# Patient Record
Sex: FEMALE | Race: BLACK OR AFRICAN AMERICAN | NOT HISPANIC OR LATINO
[De-identification: names, ages, dates, MRNs, and addresses within clinical notes are randomized per-mention and may not be internally consistent; named-entity substitution may affect disease eponyms.]

---

## 2018-05-22 ENCOUNTER — RESULT REVIEW (OUTPATIENT)
Age: 23
End: 2018-05-22

## 2019-10-25 ENCOUNTER — TRANSCRIPTION ENCOUNTER (OUTPATIENT)
Age: 24
End: 2019-10-25

## 2020-01-05 ENCOUNTER — TRANSCRIPTION ENCOUNTER (OUTPATIENT)
Age: 25
End: 2020-01-05

## 2020-04-11 ENCOUNTER — TRANSCRIPTION ENCOUNTER (OUTPATIENT)
Age: 25
End: 2020-04-11

## 2022-03-23 ENCOUNTER — TRANSCRIPTION ENCOUNTER (OUTPATIENT)
Age: 27
End: 2022-03-23

## 2022-09-27 ENCOUNTER — NON-APPOINTMENT (OUTPATIENT)
Age: 27
End: 2022-09-27

## 2022-09-28 ENCOUNTER — APPOINTMENT (OUTPATIENT)
Dept: SURGERY | Facility: CLINIC | Age: 27
End: 2022-09-28
Payer: COMMERCIAL

## 2022-09-28 VITALS
SYSTOLIC BLOOD PRESSURE: 133 MMHG | DIASTOLIC BLOOD PRESSURE: 93 MMHG | WEIGHT: 230 LBS | TEMPERATURE: 98.4 F | HEIGHT: 65 IN | BODY MASS INDEX: 38.32 KG/M2 | HEART RATE: 89 BPM | OXYGEN SATURATION: 98 %

## 2022-09-28 PROBLEM — Z00.00 ENCOUNTER FOR PREVENTIVE HEALTH EXAMINATION: Status: ACTIVE | Noted: 2022-09-28

## 2022-09-28 PROCEDURE — 46083 INC THROMBOSED HROID XTRNL: CPT

## 2022-09-28 PROCEDURE — 99203 OFFICE O/P NEW LOW 30 MIN: CPT | Mod: 1L

## 2023-02-15 ENCOUNTER — NON-APPOINTMENT (OUTPATIENT)
Age: 28
End: 2023-02-15

## 2023-02-16 ENCOUNTER — NON-APPOINTMENT (OUTPATIENT)
Age: 28
End: 2023-02-16

## 2023-03-06 ENCOUNTER — APPOINTMENT (OUTPATIENT)
Dept: CARDIOLOGY | Facility: CLINIC | Age: 28
End: 2023-03-06

## 2023-03-09 ENCOUNTER — NON-APPOINTMENT (OUTPATIENT)
Age: 28
End: 2023-03-09

## 2023-04-29 ENCOUNTER — NON-APPOINTMENT (OUTPATIENT)
Age: 28
End: 2023-04-29

## 2023-06-29 ENCOUNTER — TRANSCRIPTION ENCOUNTER (OUTPATIENT)
Age: 28
End: 2023-06-29

## 2023-06-29 ENCOUNTER — EMERGENCY (EMERGENCY)
Facility: HOSPITAL | Age: 28
LOS: 1 days | Discharge: HOME | End: 2023-06-29
Attending: EMERGENCY MEDICINE | Admitting: EMERGENCY MEDICINE
Payer: COMMERCIAL

## 2023-06-29 VITALS
HEART RATE: 87 BPM | OXYGEN SATURATION: 100 % | TEMPERATURE: 98 F | DIASTOLIC BLOOD PRESSURE: 107 MMHG | RESPIRATION RATE: 16 BRPM | SYSTOLIC BLOOD PRESSURE: 155 MMHG

## 2023-06-29 LAB
ALBUMIN SERPL ELPH-MCNC: 4.6 G/DL — SIGNIFICANT CHANGE UP (ref 3.3–5)
ALP SERPL-CCNC: 84 U/L — SIGNIFICANT CHANGE UP (ref 40–120)
ALT FLD-CCNC: 14 U/L — SIGNIFICANT CHANGE UP (ref 4–33)
ANION GAP SERPL CALC-SCNC: 15 MMOL/L — HIGH (ref 7–14)
AST SERPL-CCNC: 17 U/L — SIGNIFICANT CHANGE UP (ref 4–32)
B PERT DNA SPEC QL NAA+PROBE: SIGNIFICANT CHANGE UP
B PERT+PARAPERT DNA PNL SPEC NAA+PROBE: SIGNIFICANT CHANGE UP
BASOPHILS # BLD AUTO: 0.01 K/UL — SIGNIFICANT CHANGE UP (ref 0–0.2)
BASOPHILS NFR BLD AUTO: 0.2 % — SIGNIFICANT CHANGE UP (ref 0–2)
BILIRUB SERPL-MCNC: 0.3 MG/DL — SIGNIFICANT CHANGE UP (ref 0.2–1.2)
BORDETELLA PARAPERTUSSIS (RAPRVP): SIGNIFICANT CHANGE UP
BUN SERPL-MCNC: 8 MG/DL — SIGNIFICANT CHANGE UP (ref 7–23)
C PNEUM DNA SPEC QL NAA+PROBE: SIGNIFICANT CHANGE UP
CALCIUM SERPL-MCNC: 9.8 MG/DL — SIGNIFICANT CHANGE UP (ref 8.4–10.5)
CHLORIDE SERPL-SCNC: 103 MMOL/L — SIGNIFICANT CHANGE UP (ref 98–107)
CO2 SERPL-SCNC: 23 MMOL/L — SIGNIFICANT CHANGE UP (ref 22–31)
CREAT SERPL-MCNC: 0.86 MG/DL — SIGNIFICANT CHANGE UP (ref 0.5–1.3)
CRP SERPL-MCNC: 5.4 MG/L — HIGH
EGFR: 95 ML/MIN/1.73M2 — SIGNIFICANT CHANGE UP
EOSINOPHIL # BLD AUTO: 0.02 K/UL — SIGNIFICANT CHANGE UP (ref 0–0.5)
EOSINOPHIL NFR BLD AUTO: 0.3 % — SIGNIFICANT CHANGE UP (ref 0–6)
ERYTHROCYTE [SEDIMENTATION RATE] IN BLOOD: 13 MM/HR — SIGNIFICANT CHANGE UP (ref 4–25)
FLUAV SUBTYP SPEC NAA+PROBE: SIGNIFICANT CHANGE UP
FLUBV RNA SPEC QL NAA+PROBE: SIGNIFICANT CHANGE UP
GLUCOSE SERPL-MCNC: 91 MG/DL — SIGNIFICANT CHANGE UP (ref 70–99)
HADV DNA SPEC QL NAA+PROBE: SIGNIFICANT CHANGE UP
HCG SERPL-ACNC: <1 MIU/ML — SIGNIFICANT CHANGE UP
HCOV 229E RNA SPEC QL NAA+PROBE: SIGNIFICANT CHANGE UP
HCOV HKU1 RNA SPEC QL NAA+PROBE: SIGNIFICANT CHANGE UP
HCOV NL63 RNA SPEC QL NAA+PROBE: SIGNIFICANT CHANGE UP
HCOV OC43 RNA SPEC QL NAA+PROBE: SIGNIFICANT CHANGE UP
HCT VFR BLD CALC: 45.1 % — HIGH (ref 34.5–45)
HGB BLD-MCNC: 14.4 G/DL — SIGNIFICANT CHANGE UP (ref 11.5–15.5)
HMPV RNA SPEC QL NAA+PROBE: SIGNIFICANT CHANGE UP
HPIV1 RNA SPEC QL NAA+PROBE: SIGNIFICANT CHANGE UP
HPIV2 RNA SPEC QL NAA+PROBE: SIGNIFICANT CHANGE UP
HPIV3 RNA SPEC QL NAA+PROBE: SIGNIFICANT CHANGE UP
HPIV4 RNA SPEC QL NAA+PROBE: SIGNIFICANT CHANGE UP
IANC: 3.01 K/UL — SIGNIFICANT CHANGE UP (ref 1.8–7.4)
IMM GRANULOCYTES NFR BLD AUTO: 0.2 % — SIGNIFICANT CHANGE UP (ref 0–0.9)
LYMPHOCYTES # BLD AUTO: 2.71 K/UL — SIGNIFICANT CHANGE UP (ref 1–3.3)
LYMPHOCYTES # BLD AUTO: 44.9 % — HIGH (ref 13–44)
M PNEUMO DNA SPEC QL NAA+PROBE: SIGNIFICANT CHANGE UP
MCHC RBC-ENTMCNC: 29.3 PG — SIGNIFICANT CHANGE UP (ref 27–34)
MCHC RBC-ENTMCNC: 31.9 GM/DL — LOW (ref 32–36)
MCV RBC AUTO: 91.7 FL — SIGNIFICANT CHANGE UP (ref 80–100)
MONOCYTES # BLD AUTO: 0.28 K/UL — SIGNIFICANT CHANGE UP (ref 0–0.9)
MONOCYTES NFR BLD AUTO: 4.6 % — SIGNIFICANT CHANGE UP (ref 2–14)
NEUTROPHILS # BLD AUTO: 3.01 K/UL — SIGNIFICANT CHANGE UP (ref 1.8–7.4)
NEUTROPHILS NFR BLD AUTO: 49.8 % — SIGNIFICANT CHANGE UP (ref 43–77)
NRBC # BLD: 0 /100 WBCS — SIGNIFICANT CHANGE UP (ref 0–0)
NRBC # FLD: 0 K/UL — SIGNIFICANT CHANGE UP (ref 0–0)
PLATELET # BLD AUTO: 294 K/UL — SIGNIFICANT CHANGE UP (ref 150–400)
POTASSIUM SERPL-MCNC: 4.1 MMOL/L — SIGNIFICANT CHANGE UP (ref 3.5–5.3)
POTASSIUM SERPL-SCNC: 4.1 MMOL/L — SIGNIFICANT CHANGE UP (ref 3.5–5.3)
PROT SERPL-MCNC: 7.6 G/DL — SIGNIFICANT CHANGE UP (ref 6–8.3)
RAPID RVP RESULT: DETECTED
RBC # BLD: 4.92 M/UL — SIGNIFICANT CHANGE UP (ref 3.8–5.2)
RBC # FLD: 11.8 % — SIGNIFICANT CHANGE UP (ref 10.3–14.5)
RSV RNA SPEC QL NAA+PROBE: SIGNIFICANT CHANGE UP
RV+EV RNA SPEC QL NAA+PROBE: DETECTED
SARS-COV-2 RNA SPEC QL NAA+PROBE: SIGNIFICANT CHANGE UP
SODIUM SERPL-SCNC: 141 MMOL/L — SIGNIFICANT CHANGE UP (ref 135–145)
TSH SERPL-MCNC: 2.07 UIU/ML — SIGNIFICANT CHANGE UP (ref 0.27–4.2)
WBC # BLD: 6.04 K/UL — SIGNIFICANT CHANGE UP (ref 3.8–10.5)
WBC # FLD AUTO: 6.04 K/UL — SIGNIFICANT CHANGE UP (ref 3.8–10.5)

## 2023-06-29 PROCEDURE — 62270 DX LMBR SPI PNXR: CPT

## 2023-06-29 PROCEDURE — 72128 CT CHEST SPINE W/O DYE: CPT | Mod: 26,MA

## 2023-06-29 PROCEDURE — 72131 CT LUMBAR SPINE W/O DYE: CPT | Mod: 26,MA

## 2023-06-29 PROCEDURE — 99223 1ST HOSP IP/OBS HIGH 75: CPT | Mod: 25

## 2023-06-29 PROCEDURE — 93010 ELECTROCARDIOGRAM REPORT: CPT

## 2023-06-29 RX ORDER — MORPHINE SULFATE 50 MG/1
4 CAPSULE, EXTENDED RELEASE ORAL ONCE
Refills: 0 | Status: DISCONTINUED | OUTPATIENT
Start: 2023-06-29 | End: 2023-06-29

## 2023-06-29 RX ORDER — KETOROLAC TROMETHAMINE 30 MG/ML
30 SYRINGE (ML) INJECTION EVERY 6 HOURS
Refills: 0 | Status: COMPLETED | OUTPATIENT
Start: 2023-06-29 | End: 2023-06-30

## 2023-06-29 RX ORDER — ACETAMINOPHEN 500 MG
975 TABLET ORAL ONCE
Refills: 0 | Status: COMPLETED | OUTPATIENT
Start: 2023-06-29 | End: 2023-06-29

## 2023-06-29 RX ADMIN — Medication 30 MILLIGRAM(S): at 21:48

## 2023-06-29 RX ADMIN — Medication 975 MILLIGRAM(S): at 12:49

## 2023-06-29 RX ADMIN — MORPHINE SULFATE 4 MILLIGRAM(S): 50 CAPSULE, EXTENDED RELEASE ORAL at 19:51

## 2023-06-29 NOTE — ED PROVIDER NOTE - ATTENDING CONTRIBUTION TO CARE
Génesis Youssef MD attending physician patient with recent URI symptoms continues with cough cold also has severe LS-spine back pain which is a new problem for her that she is found excruciating.  She also notes that she now has numbness to both legs.  Patient denies any other medical history denies taking any medications denies any family history of any neurological issues.  Patient denies any IV drug abuse or other immune concerns like diabetes.  Patient and mom both deny any sort of family neurological issues like MS Guillain-Barré or myelitis.  Patient denies any trauma    Pt alert and can phonate well  h at/nc  perrl, conj clear, sclera anicteric,  neck supple  abd soft no r/g/t  ext no edema no deformities  Back patient with central tenderness no lateral tenderness paraspinal on either side of that area  neueo awake, lucid normal gait moves all extremities with strength  Quadriceps reflexes are absent on my exam   psych normal affect  vs patient with blood pressure is elevated here to 155/107 afebrile  Rectal tone as per resident.      I am highly concerned for this patient with central back pain with no other reasons now complaining of some neurologic symptoms.Including decreased reflexes on exam.We will CT to look for lesions do a sed rate and CRP as well as baseline labs and involve neuro for concern of Guillain-Barré or myelitis

## 2023-06-29 NOTE — ED CDU PROVIDER INITIAL DAY NOTE - NS ED ATTENDING STATEMENT MOD
This was a shared visit with the TROY. I reviewed and verified the documentation and independently performed the documented:

## 2023-06-29 NOTE — ED PROVIDER NOTE - PHYSICAL EXAMINATION
GENERAL: Not in acute distress, non-toxic appearing  HEAD: normocephalic, atraumatic  HEENT: PERRLA, EOMI, normal conjunctiva, oral mucosa moist, neck supple  CARDIAC: regular rate and rhythm, normal S1 and S2,  no appreciable murmurs  PULM: clear to ascultation bilaterally, no crackles, rales, rhonchi, or wheezing  GI: abdomen nondistended, soft, nontender, no guarding or rebound tenderness  NEURO: alert and oriented x 3, normal speech, no focal motor or sensory deficits, gait normal, no gross neurologic deficit 5/5 strength in all 4 extremities, normal vibratory sensation in all 4 extremities,  MSK: + point tenderness at the level of the thoracic spine, No visible deformities, no peripheral edema, calf tenderness/redness/swelling  SKIN: No visible rashes, dry, well-perfused  PSYCH: appropriate mood and affect GENERAL: Not in acute distress, non-toxic appearing  HEAD: normocephalic, atraumatic  HEENT: PERRLA, EOMI, normal conjunctiva, oral mucosa moist, neck supple  CARDIAC: regular rate and rhythm, normal S1 and S2,  no appreciable murmurs  PULM: clear to ascultation bilaterally, no crackles, rales, rhonchi, or wheezing  GI: abdomen nondistended, soft, nontender, no guarding or rebound tenderness  NEURO: alert and oriented x 3, normal speech, no focal motor or sensory deficits, gait normal, no gross neurologic deficit 5/5 strength in all 4 extremities, normal vibratory sensation in all 4 extremities, normal temperature sensation on lower extremities bilat, 2+ patellar reflexes bilat  MSK: + point tenderness at the level of the thoracic spine, No visible deformities, no peripheral edema, calf tenderness/redness/swelling  SKIN: No visible rashes, dry, well-perfused  PSYCH: appropriate mood and affect GENERAL: Not in acute distress, non-toxic appearing  HEAD: normocephalic, atraumatic  HEENT: PERRLA, EOMI, normal conjunctiva, oral mucosa moist, neck supple  CARDIAC: regular rate and rhythm, normal S1 and S2,  no appreciable murmurs  PULM: clear to ascultation bilaterally, no crackles, rales, rhonchi, or wheezing  GI: abdomen nondistended, soft, nontender, no guarding or rebound tenderness  NEURO: alert and oriented x 3, normal speech, no focal motor or sensory deficits, gait normal, no gross neurologic deficit 5/5 strength in all 4 extremities, normal vibratory sensation in all 4 extremities, normal temperature sensation on lower extremities bilat, +decreased patellar reflexes bilat  MSK: + point tenderness at the level of the thoracic spine, No visible deformities, no peripheral edema, calf tenderness/redness/swelling  SKIN: No visible rashes, dry, well-perfused  PSYCH: appropriate mood and affect

## 2023-06-29 NOTE — ED CDU PROVIDER INITIAL DAY NOTE - CLINICAL SUMMARY MEDICAL DECISION MAKING FREE TEXT BOX
To JM - Pt would like to know who to follow up with. She states she only saw SS for procedure consult (that's been canceled for now) and wasn't sure why she couldn't follow up with JM. Wanted your recommendation.     Clarified for pt EP vs General Card.    Tele monitoring, neuro checks, MRI C/T/L spine w/wo contrast / recommendations as per Neuro team following patient; supportive care, general observation care / monitoring.

## 2023-06-29 NOTE — ED CDU PROVIDER INITIAL DAY NOTE - OBJECTIVE STATEMENT
28 year old female with no PMH comes into the ED for eval of back pain and bilat lower extremity numbness. She has had cold like symptoms since the 26th, she started to have mild mid back pain on the night of the 26th. When she woke up on the 27th she had 10/10 pain that worsened when she bends over. She describes the pain as a sharp throbbing in the mid back and it does not radiate down the legs. She has constant numbness in her feet bilat and intermittent numbness in bilat hands. She has not had any recent trauma, no heavy lifting, and no recent strain or excessive muscle use, no hx of IV drug use, does not use any medications.    CDU BENJI Deluca Note----  ED Provider HPI as above, reviewed.  Pt is a 27 yo female, no stated PMH, presented to the ED c/o lower back pain, numbness to bilateral lower extremities and intermittent numbness to bilateral hands.  No hx/o injury or trauma reported.  In the ED, VSS, pt afebrile.  Neurology was consulted; LP was attempted but was unsuccessful in the ED; Neurology advised MRI C/T/L spine; additional recommendations appreciated.  Pt was sent to CDU for MRI imaging and continued care; Neurology is following pt.

## 2023-06-29 NOTE — ED ADULT NURSE NOTE - OBJECTIVE STATEMENT
Break coverage RN: Pt received in intake room 9. Pt is a&xo4, respirations even and unlabored, no acute distress in appearance. Pt reports that she has had a cold x3 days, as well as numbness/tingling in bilateral upper and lower extremities with lower back pain x2 days. Pt reports that she did not have any traumatic injury, her back pain is worse on exertion. Pt denies chest pain, shortness of breath, nausea/vomiting. Pt well appearing, skin appropriate for race. 20G IV placed in L ac, labs drawn and sent per orders. Pt medicated per orders. Will maintain safety and continue to monitor.

## 2023-06-29 NOTE — ED CDU PROVIDER INITIAL DAY NOTE - ATTENDING APP SHARED VISIT CONTRIBUTION OF CARE
Génesis Youssef MD attending physician patient with recent URI symptoms continues with cough cold also has severe LS-spine back pain which is a new problem for her that she is found excruciating.  She also notes that she now has numbness to both legs.  Patient denies any other medical history denies taking any medications denies any family history of any neurological issues.  Patient denies any IV drug abuse or other immune concerns like diabetes.  Patient and mom both deny any sort of family neurological issues like MS Guillain-Barré or myelitis.  Patient denies any trauma    Pt alert and can phonate well  h at/nc  perrl, conj clear, sclera anicteric,  neck supple  abd soft no r/g/t  ext no edema no deformities  Back patient with central tenderness no lateral tenderness paraspinal on either side of that area  neueo awake, lucid normal gait moves all extremities with strength  Quadriceps reflexes are absent on my exam   psych normal affect  vs patient with blood pressure is elevated here to 155/107 afebrile  Rectal tone as per resident.      I am highly concerned for this patient with central back pain with no other reasons now complaining of some neurologic symptoms.Including decreased reflexes on exam.We will CT to look for lesions do a sed rate and CRP as well as baseline labs and involve neuro for concern of Guillain-Barré or myelitis. .  At my signout to Dr. Negrete CT still pending patient likely will require MRI and/or LP.  High concern for significant exam.  Of note patient also with infectious internal rhinovirus.  I anticipate admission

## 2023-06-29 NOTE — ED ADULT NURSE NOTE - NSFALLUNIVINTERV_ED_ALL_ED
Bed/Stretcher in lowest position, wheels locked, appropriate side rails in place/Call bell, personal items and telephone in reach/Instruct patient to call for assistance before getting out of bed/chair/stretcher/Non-slip footwear applied when patient is off stretcher/[place] to call system/Physically safe environment - no spills, clutter or unnecessary equipment/Purposeful proactive rounding/Room/bathroom lighting operational, light cord in reach

## 2023-06-29 NOTE — ED PROCEDURE NOTE - ATTENDING CONTRIBUTION TO CARE
I was present during exam.  Attempted lumbar puncture without ability to obtain csf.  Patient tolerated procedure well and there were no immediate complications.

## 2023-06-29 NOTE — ED PROVIDER NOTE - CLINICAL SUMMARY MEDICAL DECISION MAKING FREE TEXT BOX
Génesis Youssef MD attending physician patient with recent URI symptoms continues with cough cold also has severe LS-spine back pain which is a new problem for her that she is found excruciating.  She also notes that she now has numbness to both legs.  Patient denies any other medical history denies taking any medications denies any family history of any neurological issues.  Patient denies any IV drug abuse or other immune concerns like diabetes.  Patient and mom both deny any sort of family neurological issues like MS Guillain-Barré or myelitis.  Patient denies any trauma    Pt alert and can phonate well  h at/nc  perrl, conj clear, sclera anicteric,  neck supple  abd soft no r/g/t  ext no edema no deformities  Back patient with central tenderness no lateral tenderness paraspinal on either side of that area  neueo awake, lucid normal gait moves all extremities with strength  Quadriceps reflexes are absent on my exam   psych normal affect  vs patient with blood pressure is elevated here to 155/107 afebrile  Rectal tone as per resident.      I am highly concerned for this patient with central back pain with no other reasons now complaining of some neurologic symptoms.Including decreased reflexes on exam.We will CT to look for lesions do a sed rate and CRP as well as baseline labs and involve neuro for concern of Guillain-Barré or myelitis Génesis Youssef MD attending physician patient with recent URI symptoms continues with cough cold also has severe LS-spine back pain which is a new problem for her that she is found excruciating.  She also notes that she now has numbness to both legs.  Patient denies any other medical history denies taking any medications denies any family history of any neurological issues.  Patient denies any IV drug abuse or other immune concerns like diabetes.  Patient and mom both deny any sort of family neurological issues like MS Guillain-Barré or myelitis.  Patient denies any trauma    Pt alert and can phonate well  h at/nc  perrl, conj clear, sclera anicteric,  neck supple  abd soft no r/g/t  ext no edema no deformities  Back patient with central tenderness no lateral tenderness paraspinal on either side of that area  neueo awake, lucid normal gait moves all extremities with strength  Quadriceps reflexes are absent on my exam   psych normal affect  vs patient with blood pressure is elevated here to 155/107 afebrile  Rectal tone as per resident.      I am highly concerned for this patient with central back pain with no other reasons now complaining of some neurologic symptoms.Including decreased reflexes on exam.We will CT to look for lesions do a sed rate and CRP as well as baseline labs and involve neuro for concern of Guillain-Barré or myelitis. .  At my signout to Dr. Negrete CT still pending patient likely will require MRI and/or LP.  High concern for significant exam.  Of note patient also with infectious internal rhinovirus.  I anticipate admission

## 2023-06-29 NOTE — ED ADULT NURSE REASSESSMENT NOTE - NS ED NURSE REASSESS COMMENT FT1
Pt c/o 9/10 lower back pain, medication administered per order for pain. Pt educated on side effects and aware to call for assistance prior to getting up. No acute distress noted upon leaving room. bed in lowest position, side rails up, call bell in hand, safety maintained.

## 2023-06-29 NOTE — ED PROVIDER NOTE - OBJECTIVE STATEMENT
28 year old female with no PMH comes into the ED for eval of back pain and bilat lower extremity numbness. She has had cold like symptoms since the 26th, she started to have mild mid back pain on the night of the 26th. When she woke up on the 27th she had 10/10 pain that worsened when she bends over. She describes the pain as a sharp throbbing in the mid back and it does not radiate down the legs. She has constant numbness in her feet bilat and intermittent numbness in bilat hands. She has not had any recent trauma, no heavy lifting, and no recent strain or excessive muscle use, no hx of IV drug use, does not use any medications.

## 2023-06-29 NOTE — CONSULT NOTE ADULT - ATTENDING COMMENTS
28 F with no PMH presented with back pain and paresthesias of b/l hands and feet.  On 6/26 was walking in class when she noticed mild lower back pain. On 6/27 the pain became more severe. On 6/28 she had numbness and tingling in feet, then later in the day had numbness and tingling in b/l hands.  Denies illness or vaccines in the past couple of months aside from cough that started the same day as the back pain. Sick contacts: a classmate has had strep throat.    Patient states numbness/tingling resolved as of 6/30 AM. Later, she says she has some residual L hand numbness, but it is less severe than before, and confirms that her other extremities have no symptoms. She confirms that she never lost sensation of objects touching her - she had only an internal sensation of numbness/tingling.  Still has some residual pain - she motions to a ~4-cm round area in midline of upper lumbar to mid-lumbar region, just above the LP site (this is the location of the presenting pain, not new after LP).    Exam 6/30:  Cognition: Awake, alert, oriented to situation, answers questions and follows commands briskly, attentive to conversation, provides appropriate history  Language: fluent, comprehension intact  CN: EOMI, no nystagmus, strong b/l tight eye closure, lip closure, cheek puffing. Symmetric smile. No dysarthria.  Motor: Neck flex/ext 5/5. 5/5 throughout all extremities proximally and distally.  Reflexes: 2+ b/l biceps, 1+ R BR, 0 L BR. 2+ b/l patellar. 0 b/l ankle. Plantars mute b/l.  Gait: walks independently    LP attempted, unsuccessful  Labs reviewed, CRP slightly above reference range, ESR normal  RVP+ entero/rhinovirus    MRI CTL-spine w/wo: no abnormal enhancement. degenerative changes with no significant canal/foraminal compromise  CT TL-spine unremarkable    Assessment:  Possible AIDP (Guillain-Thayer), though timeline is not typical (onset of back pain same day as respiratory infection symptoms, then paresthesias 2 days later, starting to resolve within 2 more days)  Mild symptoms, improving with no specific treatment (received Toradol, morphine, and Tylenol in ED). Some improvement in reflexes as well - 2+ b/l biceps reflexes on my exam 6/30, absent BUE reflexes on resident exam 6/29.    Recommendations:  -Given symptoms are now improving and were mild on presentation, discussed options with patient of trying to arrange LP with IR or discharging with outpatient follow-up to consider outpatient EMG/NCS. She opted for outpatient follow-up.   -Patient was given information for neurology clinic [phone]. Consider EMG/NCS outpatient. Follow up pending serum labs outpatient.  -Patient was advised to return to ED if symptoms worsen.    No further inpatient neurologic workup at this time.    Clare Galdamez MD

## 2023-06-29 NOTE — CONSULT NOTE ADULT - ASSESSMENT
28F RH with no PMH comes into the ED for lower back pain and bilat lower extremity and hand numbness. She had URI symptoms (productive cough) on 6/26/23. She started to have mild mid back pain on the night of the 6/26/23. On 6/28/23 AM, she had constant numbness and tingling in her feet bilat and intermittent numbness in bilat hands in the afternoon. Neuro exam w/ b/l UE hyporeflexia but intact patellar reflexes.     Impression: back pain, b/l LE then UE distal numbness/paresthesias in stocking/glove distribution a few days after URI (+entero/rhinovirus) possibly 2/2 early AIDP (although pt has intact patellar reflexes) vs. transverse myelitis (however pt with only subjective sensory deficits) vs. peripheral neuropathy (however pt's presentation is acute) vs. functional etiology    Recommendations:  [] observation in CDU  [] monitor on telemetry   [] frequent neurochecks  [] MRI C/T/L spine w/w/o  [] LP (at least 16cc), recommend discussion with pt to see if she would like to pursue LP now or monitor symptoms for now given mild symptoms  [] CSF studies: cell count - tube #1 and #4, protein, glucose, CSF PCR panel, protein electrophoresis, NMO, MOG, ACE, IgG index, OCB, MBP. Save some CSF - further studies to be sent only if significantly elevated WBC.  [] check NIF/VC baseline  [] check orthostatics  [] check ganglioside abs (Anti-GQ1b Ab, GM1 Ab, GD1a, GD1b), ESR, CRP, hepatitis panel, HIV, NMO, MOG, ACE  [] when ready for discharge, outpatient neurology f/u at [address] [place] [address]    Case discussed with Dr. Galdamez, to be seen in AM 28F RH with no PMH comes into the ED for lower back pain and bilat lower extremity and hand numbness. She had URI symptoms (productive cough) on 6/26/23. She started to have mild mid back pain on the night of the 6/26/23. On 6/28/23 AM, she had constant numbness and tingling in her feet bilat and intermittent numbness in bilat hands in the afternoon. Neuro exam w/ b/l UE hyporeflexia but intact patellar reflexes.     Impression: back pain, b/l LE then UE distal numbness/paresthesias in stocking/glove distribution a few days after URI (+entero/rhinovirus) possibly 2/2 early AIDP (although pt has intact patellar reflexes) vs. transverse myelitis (however pt with only subjective sensory deficits) vs. peripheral neuropathy (however pt's presentation is acute) vs. functional etiology    Recommendations:  [] observation in CDU  [] monitor on telemetry   [] frequent neurochecks  [] MRI C/T/L spine w/w/o  [] LP (at least 16cc), recommend discussion with pt to see if she would like to pursue LP now or monitor symptoms for now given mild symptoms  [] CSF studies: cell count - tube #1 and #4, protein, glucose, CSF PCR panel, protein electrophoresis, NMO, MOG, ACE, IgG index, OCB, MBP. Save some CSF - further studies to be sent only if significantly elevated WBC.  [] check NIF/VC baseline  [] check orthostatics  [] check ganglioside abs (Anti-GQ1b Ab, GM1 Ab, GD1a, GD1b), ESR, CRP, hepatitis panel, HIV, NMO, MOG, ACE  [] when ready for discharge, outpatient neurology f/u in neurology clinic [phone]    Case discussed with Dr. Galdamez, to be seen in AM

## 2023-06-29 NOTE — CONSULT NOTE ADULT - SUBJECTIVE AND OBJECTIVE BOX
HPI:  28F with no PMH comes into the ED for eval of back pain and bilat lower extremity numbness. She had URI symptoms (productive cough) on 6/26/23 but denies any fever, chills. she started to have mild mid back pain on the night of the 26th. When she woke up on the 27th she had 10/10 pain that worsened when she bends over. She describes the pain as a sharp throbbing in the mid back and it does not radiate down the legs. She has constant numbness in her feet bilat and intermittent numbness in bilat hands. She has not had any recent trauma, no heavy lifting, and no recent strain or excessive muscle use, no hx of IV drug use, does not use any medications.    NIHSS:   preMRS:      REVIEW OF SYSTEMS  A 10-system ROS was performed and is negative except for those items noted above and/or in the HPI.    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:     MEDICATIONS (HOME):  Home Medications:    MEDICATIONS  (STANDING):    MEDICATIONS  (PRN):    ALLERGIES/INTOLERANCES:  Allergies  No Known Allergies    Intolerances    VITALS & EXAMINATION:  Vital Signs Last 24 Hrs  T(C): 36.4 (29 Jun 2023 10:59), Max: 36.4 (29 Jun 2023 10:59)  T(F): 97.5 (29 Jun 2023 10:59), Max: 97.5 (29 Jun 2023 10:59)  HR: 87 (29 Jun 2023 10:59) (87 - 87)  BP: 155/107 (29 Jun 2023 10:59) (155/107 - 155/107)  BP(mean): --  RR: 16 (29 Jun 2023 10:59) (16 - 16)  SpO2: 100% (29 Jun 2023 10:59) (100% - 100%)      General:  Constitutional: Obese Female, appears stated age, in no apparent distress including pain  Head: Normocephalic & atraumatic.  ENT: Patent ear canals, intact TM, mucus membranes moist & pink, neck supple, no lymphadenopathy.   Respiratory: Patent airway. All lung fields are clear to auscultation bilaterally.  Extremities: No cyanosis, clubbing, or edema.  Skin: No rashes, bruising, or discoloration.    Cardiovascular (>2): RRR no murmurs. Carotid pulsations symmetric, no bruits. Normal capillary beds refill, 1-2 seconds or less.     Neurological (>12):  MS: Awake, alert, oriented to person, place, situation, time. Normal affect. Follows all commands.    Language: Speech is clear, fluent with good repetition & comprehension (able to name objects___)    CNs: PERRLA (R = 3mm, L = 3mm). VFF. EOMI no nystagmus, no diplopia. V1-3 intact to LT/pinprick, well developed masseter muscles b/l. No facial asymmetry b/l, full eye closure strength b/l. Hearing grossly normal (rubbing fingers) b/l. Symmetric palate elevation in midline. Gag reflex deferred. Head turning & shoulder shrug intact b/l. Tongue midline, normal movements, no atrophy.    Fundoscopic: pale w/ sharp discs margins No vascular changes.      Motor: Normal muscle bulk & tone. No noticeable tremor or seizure. No pronator drift.              Deltoid	Biceps	Triceps	Wrist	Finger ABd	   R	5	5	5	5	5		5 	  L	5	5	5	5	5		5    	H-Flex	 K-Flex	K-Ext	D-Flex	P-Flex  R	5	5	5	5	5	   L	5	5	5	5	5	      Sensation: Intact to LT/PP/Temp/Vibration/Position b/l throughout.     Cortical: Extinction on DSS (neglect): none    Reflexes:              Biceps(C5)       BR(C6)     Triceps(C7)               Patellar(L4)    Achilles(S1)    Plantar Resp  R	2	          2	             2		        2		    2		Down   L	2	          2	             2		        2		    2		Down     Coordination: intact rapid-alt movements. No dysmetria to FTN/HTS    Gait: Normal Romberg. No postural instability. Normal stance and tandem gait.     LABORATORY:  CBC                       14.4   6.04  )-----------( 294      ( 29 Jun 2023 12:50 )             45.1     Chem 06-29    141  |  103  |  8   ----------------------------<  91  4.1   |  23  |  0.86    Ca    9.8      29 Jun 2023 12:50    TPro  7.6  /  Alb  4.6  /  TBili  0.3  /  DBili  x   /  AST  17  /  ALT  14  /  AlkPhos  84  06-29    LFTs LIVER FUNCTIONS - ( 29 Jun 2023 12:50 )  Alb: 4.6 g/dL / Pro: 7.6 g/dL / ALK PHOS: 84 U/L / ALT: 14 U/L / AST: 17 U/L / GGT: x           Coagulopathy   Lipid Panel   A1c   Cardiac enzymes     U/A Urinalysis Basic - ( 29 Jun 2023 12:50 )    Color: x / Appearance: x / SG: x / pH: x  Gluc: 91 mg/dL / Ketone: x  / Bili: x / Urobili: x   Blood: x / Protein: x / Nitrite: x   Leuk Esterase: x / RBC: x / WBC x   Sq Epi: x / Non Sq Epi: x / Bacteria: x    STUDIES & IMAGING:      HPI:  29 yo RH female with no PMH comes into the ED for eval of lower back pain and bilat lower extremity and hand numbness. She had URI symptoms (productive cough) on 6/26/23 but denies any fever, chills. She started to have mild mid back pain on the night of the 6/26/23. She rated this pain 4/10. When she woke up on the 6/27/23 she had 10/10 pain that worsens when she bends over. She describes the pain as a sharp throbbing in the mid back and it does not radiate down the legs. She has constant numbness and tingling in her feet bilat and intermittent numbness in bilat hands that began on 6/28/23 in the AM. She has not had any recent trauma, no heavy lifting, and no recent strain or excessive muscle use, no hx of IV drug use, does not use any medications. She does not endorse any headache, dizziness, diplopia, dysarthria, dysphagia, nausea, vomiting, photophobia, phonophobia, gait instability, visual changes, speech changes, or hearing changes.. She has had regular bowel and bladder movements without any incontinence.          REVIEW OF SYSTEMS  A 10-system ROS was performed and is negative except for those items noted above and/or in the HPI.    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:     MEDICATIONS (HOME):  Home Medications:    MEDICATIONS  (STANDING):    MEDICATIONS  (PRN):    ALLERGIES/INTOLERANCES:  Allergies  No Known Allergies    Intolerances    VITALS & EXAMINATION:  Vital Signs Last 24 Hrs  T(C): 36.4 (29 Jun 2023 10:59), Max: 36.4 (29 Jun 2023 10:59)  T(F): 97.5 (29 Jun 2023 10:59), Max: 97.5 (29 Jun 2023 10:59)  HR: 87 (29 Jun 2023 10:59) (87 - 87)  BP: 155/107 (29 Jun 2023 10:59) (155/107 - 155/107)  BP(mean): --  RR: 16 (29 Jun 2023 10:59) (16 - 16)  SpO2: 100% (29 Jun 2023 10:59) (100% - 100%)      General:  Constitutional: Obese Female, appears stated age, in no apparent distress including pain  Head: Normocephalic & atraumatic.  ENT: Patent ear canals, intact TM, mucus membranes moist & pink, neck supple, no lymphadenopathy.   Respiratory: Patent airway. All lung fields are clear to auscultation bilaterally.  Extremities: No cyanosis, clubbing, or edema.  Skin: No rashes, bruising, or discoloration.    Cardiovascular (>2): RRR no murmurs. Carotid pulsations symmetric, no bruits. Normal capillary beds refill, 1-2 seconds or less.     Neurological (>12):  MS: Awake, alert, oriented to person, place, situation, time. Normal affect. Follows all commands.    Language: Speech is clear, fluent with good repetition & comprehension (able to name objects___)    CNs: PERRLA (R = 3mm, L = 3mm). VFF. EOMI no nystagmus, no diplopia. V1-3 intact to LT/pinprick, well developed masseter muscles b/l. No facial asymmetry b/l, full eye closure strength b/l. Hearing grossly normal (rubbing fingers) b/l. Symmetric palate elevation in midline. Gag reflex deferred. Head turning & shoulder shrug intact b/l. Tongue midline, normal movements, no atrophy.    Fundoscopic: pale w/ sharp discs margins No vascular changes.      Motor: Normal muscle bulk & tone. No noticeable tremor or seizure. No pronator drift.              Deltoid	Biceps	Triceps	Wrist	Finger ABd	   R	5	5	5	5	5		5 	  L	5	5	5	5	5		5    	H-Flex	 K-Flex	K-Ext	D-Flex	P-Flex  R	5	5	5	5	5	   L	5	5	5	5	5	      Sensation: Intact to LT/PP/Temp/Vibration/Position b/l throughout.     Cortical: Extinction on DSS (neglect): none    Reflexes:              Biceps(C5)       BR(C6)     Triceps(C7)               Patellar(L4)    Achilles(S1)    Plantar Resp  R	2	          2	             2		        2		    2		Down   L	2	          2	             2		        2		    2		Down     Coordination: intact rapid-alt movements. No dysmetria to FTN/HTS    Gait: Normal Romberg. No postural instability. Normal stance and tandem gait.     LABORATORY:  CBC                       14.4   6.04  )-----------( 294      ( 29 Jun 2023 12:50 )             45.1     Chem 06-29    141  |  103  |  8   ----------------------------<  91  4.1   |  23  |  0.86    Ca    9.8      29 Jun 2023 12:50    TPro  7.6  /  Alb  4.6  /  TBili  0.3  /  DBili  x   /  AST  17  /  ALT  14  /  AlkPhos  84  06-29    LFTs LIVER FUNCTIONS - ( 29 Jun 2023 12:50 )  Alb: 4.6 g/dL / Pro: 7.6 g/dL / ALK PHOS: 84 U/L / ALT: 14 U/L / AST: 17 U/L / GGT: x           Coagulopathy   Lipid Panel   A1c   Cardiac enzymes     U/A Urinalysis Basic - ( 29 Jun 2023 12:50 )    Color: x / Appearance: x / SG: x / pH: x  Gluc: 91 mg/dL / Ketone: x  / Bili: x / Urobili: x   Blood: x / Protein: x / Nitrite: x   Leuk Esterase: x / RBC: x / WBC x   Sq Epi: x / Non Sq Epi: x / Bacteria: x    STUDIES & IMAGING:      HPI:  28F RH with no PMH comes into the ED for lower back pain and bilat lower extremity and hand numbness. She had URI symptoms (productive cough) on 6/26/23 but denies any fever, chills. She started to have mild mid back pain on the night of the 6/26/23, 4/10 pain. When she woke up on the 6/27/23 she had 10/10 pain that worsens when she bends over. She describes the pain as a sharp throbbing in the mid back and it does not radiate down the legs. On 6/28/23 AM, she had constant numbness and tingling in her feet bilat and intermittent numbness in bilat hands in the afternoon. She has not had any recent trauma, no heavy lifting, and no recent strain or excessive muscle use, no hx of IV drug use, does not use any medications. She does not endorse any headache, dizziness, diplopia, dysarthria, dysphagia, nausea, vomiting, photophobia, phonophobia, gait instability, visual changes, speech changes, or hearing changes. She has had regular bowel and bladder movements without any incontinence. 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 No personal or family hx of autoimmune conditions.    REVIEW OF SYSTEMS  A 10-system ROS was performed and is negative except for those items noted above and/or in the HPI.    PAST MEDICAL & SURGICAL HISTORY:    FAMILY HISTORY: No personal or family hx of autoimmune conditions.    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    MEDICATIONS (HOME):  Home Medications:    MEDICATIONS  (STANDING):    MEDICATIONS  (PRN):    ALLERGIES/INTOLERANCES:  Allergies  No Known Allergies    Intolerances    VITALS & EXAMINATION:  Vital Signs Last 24 Hrs  T(C): 36.4 (29 Jun 2023 10:59), Max: 36.4 (29 Jun 2023 10:59)  T(F): 97.5 (29 Jun 2023 10:59), Max: 97.5 (29 Jun 2023 10:59)  HR: 87 (29 Jun 2023 10:59) (87 - 87)  BP: 155/107 (29 Jun 2023 10:59) (155/107 - 155/107)  BP(mean): --  RR: 16 (29 Jun 2023 10:59) (16 - 16)  SpO2: 100% (29 Jun 2023 10:59) (100% - 100%)      General:  Constitutional: Obese Female, appears stated age, in no apparent distress including pain  Head: Normocephalic & atraumatic.  Respiratory: No increased work of breathing at rest  Extremities: No cyanosis, clubbing, or edema.  Skin: No rashes, bruising, or discoloration.    Neurological (>12):  MS: Awake, alert, oriented to person, place, situation, time. Normal affect. Follows all commands.    Language: Speech is clear, fluent with good repetition & comprehension (able to name objects mask, thumb), giving full hx    CNs: PERRL (R = 3mm, L = 3mm). VFF. EOMI no nystagmus, no diplopia. V1-3 intact to LT/pinprick, well developed masseter muscles b/l. No facial asymmetry b/l, full eye closure strength b/l. Hearing grossly normal (rubbing fingers) b/l. Symmetric palate elevation in midline. Gag reflex deferred. Head turning & shoulder shrug intact b/l. Tongue midline, normal movements, no atrophy.    Motor: Normal muscle bulk & tone. No noticeable tremor or seizure. No pronator drift.              Deltoid	Biceps	Triceps	Wrist	Finger ABd	   R	5	5	5	5	5		5 	  L	5	5	5	5	5		5    	H-Flex	 K-Flex	K-Ext	D-Flex	P-Flex  R	5	5	5	5	5	   L	5	5	5	5	5	      Sensation: Intact to LT/PP/Temp/Vibration/Position b/l throughout. No sensory level    Cortical: Extinction on DSS (neglect): none    Reflexes: + R suprapatellar              Biceps(C5)       BR(C6)     Triceps(C7)               Patellar(L4)    Achilles(S1)    Plantar Resp  R	0	          0             0		        2		    0		mute  L	0	          0             0		        2		    0		mute     Coordination: intact rapid-alt movements. No dysmetria to FTN    Gait: Normal Romberg. No postural instability. Normal stance and tandem gait. Able to walk on heels and toes    LABORATORY:  CBC                       14.4   6.04  )-----------( 294      ( 29 Jun 2023 12:50 )             45.1     Chem 06-29    141  |  103  |  8   ----------------------------<  91  4.1   |  23  |  0.86    Ca    9.8      29 Jun 2023 12:50    TPro  7.6  /  Alb  4.6  /  TBili  0.3  /  DBili  x   /  AST  17  /  ALT  14  /  AlkPhos  84  06-29    LFTs LIVER FUNCTIONS - ( 29 Jun 2023 12:50 )  Alb: 4.6 g/dL / Pro: 7.6 g/dL / ALK PHOS: 84 U/L / ALT: 14 U/L / AST: 17 U/L / GGT: x          U/A Urinalysis Basic - ( 29 Jun 2023 12:50 )    Color: x / Appearance: x / SG: x / pH: x  Gluc: 91 mg/dL / Ketone: x  / Bili: x / Urobili: x   Blood: x / Protein: x / Nitrite: x   Leuk Esterase: x / RBC: x / WBC x   Sq Epi: x / Non Sq Epi: x / Bacteria: x    STUDIES & IMAGING:      HPI:  28F RH with no PMH comes into the ED for lower back pain and bilat lower extremity and hand numbness. She had respiratory symptoms (productive cough) on 6/26/23 but denies any fever, chills. She started to have mild mid back pain on the night of the 6/26/23, 4/10 pain. When she woke up on the 6/27/23 she had 10/10 pain that worsens when she bends over. She describes the pain as a sharp throbbing in the mid back and it does not radiate down the legs. On 6/28/23 AM, she had constant numbness and tingling in her feet bilat and intermittent numbness in bilat hands in the afternoon. She has not had any recent trauma, no heavy lifting, and no recent strain or excessive muscle use, no hx of IV drug use, does not use any medications. She does not endorse any headache, dizziness, diplopia, dysarthria, dysphagia, nausea, vomiting, photophobia, phonophobia, gait instability, visual changes, speech changes, or hearing changes. She has had regular bowel and bladder movements without any incontinence. 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 No personal or family hx of autoimmune conditions.    REVIEW OF SYSTEMS  A 10-system ROS was performed and is negative except for those items noted above and/or in the HPI.    PAST MEDICAL & SURGICAL HISTORY:    FAMILY HISTORY: No personal or family hx of autoimmune conditions.    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    MEDICATIONS (HOME):  Home Medications:    MEDICATIONS  (STANDING):    MEDICATIONS  (PRN):    ALLERGIES/INTOLERANCES:  Allergies  No Known Allergies    Intolerances    VITALS & EXAMINATION:  Vital Signs Last 24 Hrs  T(C): 36.4 (29 Jun 2023 10:59), Max: 36.4 (29 Jun 2023 10:59)  T(F): 97.5 (29 Jun 2023 10:59), Max: 97.5 (29 Jun 2023 10:59)  HR: 87 (29 Jun 2023 10:59) (87 - 87)  BP: 155/107 (29 Jun 2023 10:59) (155/107 - 155/107)  BP(mean): --  RR: 16 (29 Jun 2023 10:59) (16 - 16)  SpO2: 100% (29 Jun 2023 10:59) (100% - 100%)      General:  Constitutional: Obese Female, appears stated age, in no apparent distress including pain  Head: Normocephalic & atraumatic.  Respiratory: No increased work of breathing at rest  Extremities: No cyanosis, clubbing, or edema.  Skin: No rashes, bruising, or discoloration.    Neurological (>12):  MS: Awake, alert, oriented to person, place, situation, time. Normal affect. Follows all commands.    Language: Speech is clear, fluent with good repetition & comprehension (able to name objects mask, thumb), giving full hx    CNs: PERRL (R = 3mm, L = 3mm). VFF. EOMI no nystagmus, no diplopia. V1-3 intact to LT/pinprick, well developed masseter muscles b/l. No facial asymmetry b/l, full eye closure strength b/l. Hearing grossly normal (rubbing fingers) b/l. Symmetric palate elevation in midline. Gag reflex deferred. Head turning & shoulder shrug intact b/l. Tongue midline, normal movements, no atrophy.    Motor: Normal muscle bulk & tone. No noticeable tremor or seizure. No pronator drift.              Deltoid	Biceps	Triceps	Wrist	Finger ABd	   R	5	5	5	5	5		5 	  L	5	5	5	5	5		5    	H-Flex	 K-Flex	K-Ext	D-Flex	P-Flex  R	5	5	5	5	5	   L	5	5	5	5	5	      Sensation: Intact to LT/PP/Temp/Vibration/Position b/l throughout. No sensory level    Cortical: Extinction on DSS (neglect): none    Reflexes: + R suprapatellar              Biceps(C5)       BR(C6)     Triceps(C7)               Patellar(L4)    Achilles(S1)    Plantar Resp  R	0	          0             0		        2		    0		mute  L	0	          0             0		        2		    0		mute     Coordination: intact rapid-alt movements. No dysmetria to FTN    Gait: Normal Romberg. No postural instability. Normal stance and tandem gait. Able to walk on heels and toes    LABORATORY:  CBC                       14.4   6.04  )-----------( 294      ( 29 Jun 2023 12:50 )             45.1     Chem 06-29    141  |  103  |  8   ----------------------------<  91  4.1   |  23  |  0.86    Ca    9.8      29 Jun 2023 12:50    TPro  7.6  /  Alb  4.6  /  TBili  0.3  /  DBili  x   /  AST  17  /  ALT  14  /  AlkPhos  84  06-29    LFTs LIVER FUNCTIONS - ( 29 Jun 2023 12:50 )  Alb: 4.6 g/dL / Pro: 7.6 g/dL / ALK PHOS: 84 U/L / ALT: 14 U/L / AST: 17 U/L / GGT: x          U/A Urinalysis Basic - ( 29 Jun 2023 12:50 )    Color: x / Appearance: x / SG: x / pH: x  Gluc: 91 mg/dL / Ketone: x  / Bili: x / Urobili: x   Blood: x / Protein: x / Nitrite: x   Leuk Esterase: x / RBC: x / WBC x   Sq Epi: x / Non Sq Epi: x / Bacteria: x    STUDIES & IMAGING:

## 2023-06-29 NOTE — ED ADULT NURSE REASSESSMENT NOTE - NS ED NURSE REASSESS COMMENT FT1
Pt recievd AxOx4 ambulatory presenting with back pain that radiates to legs and hands. Pt denies heavy lifting but endorses being sick this past week. Pt is well appearing. No neuro deficits noted, L20G intact and patent. Pending CDU bed placement.

## 2023-06-29 NOTE — ED CDU PROVIDER INITIAL DAY NOTE - DETAILS
Tele monitoring, neuro checks, MRI C/T/L spine w/wo contrast / recommendations as per Neuro team following patient; supportive care, general observation care / monitoring.

## 2023-06-30 VITALS
DIASTOLIC BLOOD PRESSURE: 73 MMHG | SYSTOLIC BLOOD PRESSURE: 122 MMHG | OXYGEN SATURATION: 99 % | TEMPERATURE: 98 F | HEART RATE: 83 BPM | RESPIRATION RATE: 18 BRPM

## 2023-06-30 PROCEDURE — 72156 MRI NECK SPINE W/O & W/DYE: CPT | Mod: 26,MA

## 2023-06-30 PROCEDURE — 99284 EMERGENCY DEPT VISIT MOD MDM: CPT | Mod: GC

## 2023-06-30 PROCEDURE — 99238 HOSP IP/OBS DSCHRG MGMT 30/<: CPT

## 2023-06-30 PROCEDURE — G1004: CPT

## 2023-06-30 PROCEDURE — 72158 MRI LUMBAR SPINE W/O & W/DYE: CPT | Mod: 26,MG

## 2023-06-30 PROCEDURE — 72147 MRI CHEST SPINE W/DYE: CPT | Mod: 26,MA

## 2023-06-30 RX ORDER — CYCLOBENZAPRINE HYDROCHLORIDE 10 MG/1
1 TABLET, FILM COATED ORAL
Qty: 9 | Refills: 0
Start: 2023-06-30 | End: 2023-07-02

## 2023-06-30 RX ADMIN — Medication 30 MILLIGRAM(S): at 06:11

## 2023-06-30 RX ADMIN — Medication 30 MILLIGRAM(S): at 06:41

## 2023-06-30 NOTE — ED CDU PROVIDER DISPOSITION NOTE - CARE PROVIDER_API CALL
Maverick Mckenzie)  Neurology; Neurophysiology  [address], [address]  [place] [address]  Phone: [phone]  Fax: [phone]  Follow Up Time:

## 2023-06-30 NOTE — ED CDU PROVIDER SUBSEQUENT DAY NOTE - HISTORY
27 yo female, no stated PMH, presented to the ED c/o lower back pain, numbness to bilateral lower extremities and intermittent numbness to bilateral hands.  No hx/o injury or trauma reported.  In the ED, VSS, pt afebrile.  Neurology was consulted; LP was attempted but was unsuccessful in the ED; Neurology advised MRI C/T/L spine; additional recommendations appreciated.  Pt was sent to CDU for MRI imaging and continued care; Neurology is following pt.  In the interim, pt objectively noted to be resting comfortably; pt has been clinically stable; no issues thus far.

## 2023-06-30 NOTE — ED CDU PROVIDER DISPOSITION NOTE - NSFOLLOWUPINSTRUCTIONS_ED_ALL_ED_FT
Follow up with your primary care doctor within 1 week  Take Ibuprofen 600mg every 6-8 hours as needed for back pain, take with food  -You can also take Tylenol 650mg every 6 hours as needed for pain  Return to the ER with any worsening or concerning symptoms, increased pain, numbness, weakness, bowel or bladder incontinence or any other concerns. Follow up with neurologist , discuss having EMG performed, office information listed above please call to make an appointment   Follow up with your primary care doctor within 1 week  You can also follow up in the spine center for back pain (with disc bulges on MRI), clinic information attached please call to make an appointment  Take Naproxen 500mg (1 tablet) twice a day, take with food  -You can also take Tylenol 650mg every 6 hours as needed for pain  Return to the ER with any worsening or concerning symptoms, increased pain, numbness, weakness, bowel or bladder incontinence or any other concerns.

## 2023-06-30 NOTE — ED CDU PROVIDER SUBSEQUENT DAY NOTE - PROGRESS NOTE DETAILS
Patient signed out to me pending MRI results and neuro evaluation.  MRI resulted showing disc bulges from C3-C6, T5-6 there is a central to right parasagittal disc protrusion which causes effacement of ventral thecal sac and minimal effacement of the ventral spinal cord just to the right of midline with no significant compromise of the spinal canal or either neural, at T6-7 there is a small right-sided disc protrusion which causes minimal effacement of the ventral thecal sac, at L5-S1 disc bulges seen with associated T2 probable prolongation within this disc bulge likely compatible with an annular fissure with minimal effacement of the central thecal sac, no significant compromise of the spinal canal or either neural foramen.  Overall impression degenerative changes and disc protrusions.  Patient reassessed by neurology, she no longer is having numbness or tingling in the hands or feet does report continued mild low back pain.  Neurology recommending outpatient follow-up for EMG.  At time of discharge patient is well-appearing, vitals stable, non focal neuro exam, pt is ambulatory in CDU without difficulty. Discharge discussed with CDU attending.

## 2023-06-30 NOTE — ED CDU PROVIDER DISPOSITION NOTE - PATIENT PORTAL LINK FT
You can access the FollowMyHealth Patient Portal offered by [facility] by registering at the following website: [email or web address] By joining [facility]’s FollowMyHealth portal, you will also be able to view your health information using other applications (apps) compatible with our system.

## 2023-07-12 LAB
GD1A GANGL IGG+IGM SER IA-ACNC: SIGNIFICANT CHANGE UP IV (ref 0–50)
GD1B GANGL IGG+IGM SER IA-ACNC: 10 IV — SIGNIFICANT CHANGE UP (ref 0–50)
GM1 ASIALO IGG+IGM SER IA-ACNC: 20 IV — SIGNIFICANT CHANGE UP (ref 0–50)
GM1 GANGL IGG+IGM SER-ACNC: 12 IV — SIGNIFICANT CHANGE UP (ref 0–50)
GM2 GANGL IGG+IGM SER IA-ACNC: 18 IV — SIGNIFICANT CHANGE UP (ref 0–50)
GQ1B GANGL IGG+IGM SER IA-ACNC: 8 IV — SIGNIFICANT CHANGE UP (ref 0–50)

## 2024-01-30 ENCOUNTER — NON-APPOINTMENT (OUTPATIENT)
Age: 29
End: 2024-01-30

## 2024-03-11 ENCOUNTER — NON-APPOINTMENT (OUTPATIENT)
Age: 29
End: 2024-03-11

## 2024-08-25 ENCOUNTER — NON-APPOINTMENT (OUTPATIENT)
Age: 29
End: 2024-08-25